# Patient Record
Sex: MALE | Race: BLACK OR AFRICAN AMERICAN
[De-identification: names, ages, dates, MRNs, and addresses within clinical notes are randomized per-mention and may not be internally consistent; named-entity substitution may affect disease eponyms.]

---

## 2021-11-21 ENCOUNTER — HOSPITAL ENCOUNTER (INPATIENT)
Dept: HOSPITAL 54 - ER | Age: 36
LOS: 3 days | Discharge: HOME | DRG: 383 | End: 2021-11-24
Attending: NURSE PRACTITIONER | Admitting: NURSE PRACTITIONER
Payer: MEDICAID

## 2021-11-21 VITALS — DIASTOLIC BLOOD PRESSURE: 62 MMHG | SYSTOLIC BLOOD PRESSURE: 143 MMHG

## 2021-11-21 VITALS — HEIGHT: 70 IN | WEIGHT: 163.31 LBS | BODY MASS INDEX: 23.38 KG/M2

## 2021-11-21 DIAGNOSIS — S80.821A: ICD-10-CM
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
## 2021-11-21 DIAGNOSIS — Z20.822: ICD-10-CM

## 2021-11-21 DIAGNOSIS — Z86.19: ICD-10-CM

## 2021-11-21 DIAGNOSIS — Z79.899: ICD-10-CM
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
## 2021-11-21 DIAGNOSIS — L03.115: Primary | ICD-10-CM

## 2021-11-21 DIAGNOSIS — R59.0: ICD-10-CM
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
## 2021-11-21 DIAGNOSIS — D69.6: ICD-10-CM

## 2021-11-21 DIAGNOSIS — M79.661: ICD-10-CM

## 2021-11-21 LAB
BASOPHILS # BLD AUTO: 0 K/UL (ref 0–0.2)
BASOPHILS NFR BLD AUTO: 0.2 % (ref 0–2)
BUN SERPL-MCNC: 16 MG/DL (ref 7–18)
CALCIUM SERPL-MCNC: 8.6 MG/DL (ref 8.5–10.1)
CHLORIDE SERPL-SCNC: 100 MMOL/L (ref 98–107)
CO2 SERPL-SCNC: 32 MMOL/L (ref 21–32)
CREAT SERPL-MCNC: 1.3 MG/DL (ref 0.6–1.3)
EOSINOPHIL NFR BLD AUTO: 0.4 % (ref 0–6)
GLUCOSE SERPL-MCNC: 97 MG/DL (ref 74–106)
HCT VFR BLD AUTO: 43 % (ref 39–51)
HGB BLD-MCNC: 14.2 G/DL (ref 13.5–17.5)
LYMPHOCYTES NFR BLD AUTO: 1.2 K/UL (ref 0.8–4.8)
LYMPHOCYTES NFR BLD AUTO: 15.6 % (ref 20–44)
MCHC RBC AUTO-ENTMCNC: 33 G/DL (ref 31–36)
MCV RBC AUTO: 81 FL (ref 80–96)
MONOCYTES NFR BLD AUTO: 1.1 K/UL (ref 0.1–1.3)
MONOCYTES NFR BLD AUTO: 14 % (ref 2–12)
NEUTROPHILS # BLD AUTO: 5.5 K/UL (ref 1.8–8.9)
NEUTROPHILS NFR BLD AUTO: 69.8 % (ref 43–81)
PLATELET # BLD AUTO: 126 K/UL (ref 150–450)
POTASSIUM SERPL-SCNC: 3.5 MMOL/L (ref 3.5–5.1)
RBC # BLD AUTO: 5.34 MIL/UL (ref 4.5–6)
SODIUM SERPL-SCNC: 137 MMOL/L (ref 136–145)
WBC NRBC COR # BLD AUTO: 7.9 K/UL (ref 4.3–11)

## 2021-11-21 PROCEDURE — G0378 HOSPITAL OBSERVATION PER HR: HCPCS

## 2021-11-21 RX ADMIN — SODIUM CHLORIDE PRN MLS/HR: 9 INJECTION, SOLUTION INTRAVENOUS at 23:28

## 2021-11-21 RX ADMIN — ENOXAPARIN SODIUM SCH MG: 40 INJECTION SUBCUTANEOUS at 22:12

## 2021-11-21 RX ADMIN — CEFEPIME HYDROCHLORIDE SCH MLS/HR: 1 INJECTION, POWDER, FOR SOLUTION INTRAMUSCULAR; INTRAVENOUS at 23:28

## 2021-11-21 RX ADMIN — ACETAMINOPHEN PRN MG: 325 TABLET ORAL at 22:52

## 2021-11-21 NOTE — NUR
patient came in to the er c/o R leg cellulitis, on room air, breathing evenly, 
connected to the monitor and pulse ox, Kept comfortable, will continue to 
monitor accordingly.

## 2021-11-21 NOTE — NUR
ADMITTED PATIENT FROM ED DUE TO RIGHT LOWER LEG CELLULITIS. ALERT/ORIENTED X4, ROOM AIR, 
LUNG SOUNDS ARE CLEAR, ABDOMEN SOFT AND NON-TENDER, DENIES NUMBNESS OF BLE. PAIN OF THE 
RIGHT LYMPH NODE AND RIGHT LEG, REDNESS, MILD SWELLING, PAIN ON AMBULATION, PER PATIENT, HAS 
HISTORY OF SHINGLES, KEPT SAFE, WILL CONTINUE TO MONITOR.

## 2021-11-22 VITALS — DIASTOLIC BLOOD PRESSURE: 69 MMHG | SYSTOLIC BLOOD PRESSURE: 119 MMHG

## 2021-11-22 VITALS — DIASTOLIC BLOOD PRESSURE: 61 MMHG | SYSTOLIC BLOOD PRESSURE: 126 MMHG

## 2021-11-22 VITALS — DIASTOLIC BLOOD PRESSURE: 71 MMHG | SYSTOLIC BLOOD PRESSURE: 143 MMHG

## 2021-11-22 LAB
ALBUMIN SERPL BCP-MCNC: 3.3 G/DL (ref 3.4–5)
ALP SERPL-CCNC: 72 U/L (ref 46–116)
ALT SERPL W P-5'-P-CCNC: 30 U/L (ref 12–78)
AST SERPL W P-5'-P-CCNC: 37 U/L (ref 15–37)
BASOPHILS # BLD AUTO: 0 K/UL (ref 0–0.2)
BASOPHILS NFR BLD AUTO: 0.3 % (ref 0–2)
BILIRUB SERPL-MCNC: 0.8 MG/DL (ref 0.2–1)
BUN SERPL-MCNC: 13 MG/DL (ref 7–18)
CALCIUM SERPL-MCNC: 8.8 MG/DL (ref 8.5–10.1)
CHLORIDE SERPL-SCNC: 102 MMOL/L (ref 98–107)
CO2 SERPL-SCNC: 30 MMOL/L (ref 21–32)
CREAT SERPL-MCNC: 1.3 MG/DL (ref 0.6–1.3)
EOSINOPHIL NFR BLD AUTO: 1.2 % (ref 0–6)
GLUCOSE SERPL-MCNC: 111 MG/DL (ref 74–106)
HCT VFR BLD AUTO: 39 % (ref 39–51)
HGB BLD-MCNC: 13.5 G/DL (ref 13.5–17.5)
LYMPHOCYTES NFR BLD AUTO: 1.7 K/UL (ref 0.8–4.8)
LYMPHOCYTES NFR BLD AUTO: 23.5 % (ref 20–44)
LYMPHOCYTES NFR BLD MANUAL: 28 % (ref 16–48)
MAGNESIUM SERPL-MCNC: 2.1 MG/DL (ref 1.8–2.4)
MCHC RBC AUTO-ENTMCNC: 34 G/DL (ref 31–36)
MCV RBC AUTO: 80 FL (ref 80–96)
MONOCYTES NFR BLD AUTO: 1.3 K/UL (ref 0.1–1.3)
MONOCYTES NFR BLD AUTO: 18.1 % (ref 2–12)
MONOCYTES NFR BLD MANUAL: 13 % (ref 0–11)
NEUTROPHILS # BLD AUTO: 4.1 K/UL (ref 1.8–8.9)
NEUTROPHILS NFR BLD AUTO: 56.9 % (ref 43–81)
NEUTS SEG NFR BLD MANUAL: 59 % (ref 42–76)
PHOSPHATE SERPL-MCNC: 2.8 MG/DL (ref 2.5–4.9)
PLATELET # BLD AUTO: 132 K/UL (ref 150–450)
POTASSIUM SERPL-SCNC: 3.3 MMOL/L (ref 3.5–5.1)
PROT SERPL-MCNC: 7.2 G/DL (ref 6.4–8.2)
RBC # BLD AUTO: 4.94 MIL/UL (ref 4.5–6)
SODIUM SERPL-SCNC: 139 MMOL/L (ref 136–145)
WBC NRBC COR # BLD AUTO: 7.2 K/UL (ref 4.3–11)

## 2021-11-22 RX ADMIN — CEFEPIME HYDROCHLORIDE SCH MLS/HR: 1 INJECTION, POWDER, FOR SOLUTION INTRAMUSCULAR; INTRAVENOUS at 16:16

## 2021-11-22 RX ADMIN — ACETAMINOPHEN PRN MG: 325 TABLET ORAL at 08:15

## 2021-11-22 RX ADMIN — PANTOPRAZOLE SODIUM SCH MG: 40 TABLET, DELAYED RELEASE ORAL at 08:16

## 2021-11-22 RX ADMIN — DEXTROSE MONOHYDRATE SCH MLS/HR: 50 INJECTION, SOLUTION INTRAVENOUS at 18:34

## 2021-11-22 RX ADMIN — ENOXAPARIN SODIUM SCH MG: 40 INJECTION SUBCUTANEOUS at 21:30

## 2021-11-22 RX ADMIN — CEFEPIME HYDROCHLORIDE SCH MLS/HR: 1 INJECTION, POWDER, FOR SOLUTION INTRAMUSCULAR; INTRAVENOUS at 08:16

## 2021-11-22 NOTE — NUR
ALERT/ORIENTED X4, ROOM AIR, COMPLAINING OF PAIN OF THE RIGHT GROIN LYMPH NODE AND RIGHT 
LEG, DX CELLULITIS OF THE RIGHT LOWER EXTREMITIES. RIGHT LEG IS RED, WITH MILD SWELLING, 
PAINFUL ON AMBULATION. NS AT 75 ML/HR, MAXIPIME Q8HRS, GIVEN IN ED AND IN THE UNIT. 
VANCOMYCIN 1 GRAM X1. WILL ORDER WOUND CARE CONSULT.

## 2021-11-22 NOTE — NUR
RN OPENING NOTE



PATIENT AWAKE IN BED RESTING.  FAMILY IN ROOM. A/O X4. NO S/S OF PAIN NOTED AT THIS TIME. ON 
ROOM AIR. NO DISTRESS OR SHORTNESS OF BREATH NOTED, BREATHING EVEN/UNLABORED. IV ACCESS RAC 
#18G NS 75ML/HR. FALL AND SAFETY MEASURES IN PLACE, BED ALARM ON, BED IN LOW AND LOCK 
POSITION, CALL LIGHT AND TABLE WITHIN EASY REACH, SIDE RAILS UP X2. WILL CONTINUE TO 
MONITOR.

-------------------------------------------------------------------------------

Addendum: 11/22/21 at 1847 by Alyssa Ibarra RN

-------------------------------------------------------------------------------

RN CLOSING NOTE

## 2021-11-22 NOTE — NUR
RN OPENING NOTE



PATIENT AS SLEEP IN BED RESTING. AWAKEN TO VERBAL STIMULI A/O X4. NO S/S OF PAIN NOTED AT 
THIS TIME. ON ROOM AIR. NO DISTRESS OR SHORTNESS OF BREATH NOTED, BREATHING EVEN/UNLABORED. 
IV ACCESS RAC #18G NS 75ML/HR. FALL AND SAFETY MEASURES IN PLACE, BED ALARM ON, BED IN LOW 
AND LOCK POSITION, CALL LIGHT AND TABLE WITHIN EASY REACH, SIDE RAILS UP X2. WILL CONTINUE 
TO MONITOR.

## 2021-11-22 NOTE — NUR
MS RN OPENING NOTES:

RECEIVED PATIENT IN BED, AWAKE, A/O X3. NO S/S OF DISTRESS. PATIENT DENIES ANY PAIN AT THIS 
TIME. RESPIRATION EVEN AND UNLABORED WITH EQUAL RISE AND FALL OF THE CHEST, ON ROOM AIR. ALL 
SAFETY MEASURES IN PLACE. WILL CONTINUE TO MONITOR.

## 2021-11-22 NOTE — NUR
WOUND CARE CONSULT: PT PRESENTS WITH RT LOWER LEG DISCOLORATION AND FRAGILE INTACT 
BLISTERING, PRESENT ON ADMISSION. DPM CONSULT CALLED TO DR LINDSEY CLAUDIO IN AGREEMENT WITH 
PLAN OF CARE.

## 2021-11-23 VITALS — SYSTOLIC BLOOD PRESSURE: 137 MMHG | DIASTOLIC BLOOD PRESSURE: 66 MMHG

## 2021-11-23 VITALS — DIASTOLIC BLOOD PRESSURE: 78 MMHG | SYSTOLIC BLOOD PRESSURE: 121 MMHG

## 2021-11-23 LAB
BASOPHILS # BLD AUTO: 0 K/UL (ref 0–0.2)
BASOPHILS NFR BLD AUTO: 0.5 % (ref 0–2)
BUN SERPL-MCNC: 12 MG/DL (ref 7–18)
CALCIUM SERPL-MCNC: 8.6 MG/DL (ref 8.5–10.1)
CHLORIDE SERPL-SCNC: 103 MMOL/L (ref 98–107)
CO2 SERPL-SCNC: 32 MMOL/L (ref 21–32)
CREAT SERPL-MCNC: 1.2 MG/DL (ref 0.6–1.3)
EOSINOPHIL NFR BLD AUTO: 3.6 % (ref 0–6)
GLUCOSE SERPL-MCNC: 111 MG/DL (ref 74–106)
HCT VFR BLD AUTO: 40 % (ref 39–51)
HGB BLD-MCNC: 13.4 G/DL (ref 13.5–17.5)
LYMPHOCYTES NFR BLD AUTO: 1.8 K/UL (ref 0.8–4.8)
LYMPHOCYTES NFR BLD AUTO: 30.6 % (ref 20–44)
MAGNESIUM SERPL-MCNC: 2 MG/DL (ref 1.8–2.4)
MCHC RBC AUTO-ENTMCNC: 33 G/DL (ref 31–36)
MCV RBC AUTO: 79 FL (ref 80–96)
MONOCYTES NFR BLD AUTO: 0.8 K/UL (ref 0.1–1.3)
MONOCYTES NFR BLD AUTO: 14 % (ref 2–12)
NEUTROPHILS # BLD AUTO: 3 K/UL (ref 1.8–8.9)
NEUTROPHILS NFR BLD AUTO: 51.3 % (ref 43–81)
PHOSPHATE SERPL-MCNC: 3.4 MG/DL (ref 2.5–4.9)
PLATELET # BLD AUTO: 150 K/UL (ref 150–450)
POTASSIUM SERPL-SCNC: 3.6 MMOL/L (ref 3.5–5.1)
RBC # BLD AUTO: 5.06 MIL/UL (ref 4.5–6)
SODIUM SERPL-SCNC: 140 MMOL/L (ref 136–145)
WBC NRBC COR # BLD AUTO: 5.8 K/UL (ref 4.3–11)

## 2021-11-23 RX ADMIN — PANTOPRAZOLE SODIUM SCH MG: 40 TABLET, DELAYED RELEASE ORAL at 08:19

## 2021-11-23 RX ADMIN — DEXTROSE MONOHYDRATE SCH MLS/HR: 50 INJECTION, SOLUTION INTRAVENOUS at 04:22

## 2021-11-23 RX ADMIN — SODIUM CHLORIDE PRN MLS/HR: 9 INJECTION, SOLUTION INTRAVENOUS at 12:29

## 2021-11-23 RX ADMIN — DEXTROSE MONOHYDRATE SCH MLS/HR: 50 INJECTION, SOLUTION INTRAVENOUS at 17:05

## 2021-11-23 RX ADMIN — CEFEPIME HYDROCHLORIDE SCH MLS/HR: 1 INJECTION, POWDER, FOR SOLUTION INTRAMUSCULAR; INTRAVENOUS at 23:52

## 2021-11-23 RX ADMIN — CEFEPIME HYDROCHLORIDE SCH MLS/HR: 1 INJECTION, POWDER, FOR SOLUTION INTRAMUSCULAR; INTRAVENOUS at 00:20

## 2021-11-23 RX ADMIN — CEFEPIME HYDROCHLORIDE SCH MLS/HR: 1 INJECTION, POWDER, FOR SOLUTION INTRAMUSCULAR; INTRAVENOUS at 15:42

## 2021-11-23 RX ADMIN — ENOXAPARIN SODIUM SCH MG: 40 INJECTION SUBCUTANEOUS at 20:43

## 2021-11-23 RX ADMIN — CEFEPIME HYDROCHLORIDE SCH MLS/HR: 1 INJECTION, POWDER, FOR SOLUTION INTRAMUSCULAR; INTRAVENOUS at 08:20

## 2021-11-23 NOTE — NUR
MS RN OPENING NOTE



RECEIVED PATIENT ALERT/ ORIENTED X 4. PATIENT IS ON ROOM AIR WITH EQUAL AND UNLABORED 
BREATHING WITH NO SIGNS OF RESPIRATORY DISTRESS. PATIENT DOS NOT COMPLAIN OF PAIN OR 
DISCOMFORT FOR NOW. PATIENT WITH IV ACCESS ON RIGHT AC G 18 WITH IVF OF NS RUNNING AT 
75ML/HR INFUSING WELL. SAFETY MEASURES ENSURED WITH BED LOCKED AND AT LOWEST POSITION, WITH 
SIDERAILS UP. CALL LIGHT AND TABLE WITHIN REACH AT ALL TIMES. WILL CONTINUE TO MONITOR 
PATIENT.

## 2021-11-23 NOTE — NUR
RN OPENING NOTES

Patient is A&Ox4, girlfriend at bedside. Says only very minimal pain to Right lower leg but 
is better when elevated on pillow. Denies side effects from abx. Tolerating well. RAC #18G 
IV patent and infusing NS at 75cc/hr. Will continue with plan of care.

## 2021-11-24 VITALS — DIASTOLIC BLOOD PRESSURE: 75 MMHG | SYSTOLIC BLOOD PRESSURE: 143 MMHG

## 2021-11-24 LAB
BASOPHILS # BLD AUTO: 0 K/UL (ref 0–0.2)
BASOPHILS NFR BLD AUTO: 0.4 % (ref 0–2)
BUN SERPL-MCNC: 7 MG/DL (ref 7–18)
CALCIUM SERPL-MCNC: 8.8 MG/DL (ref 8.5–10.1)
CHLORIDE SERPL-SCNC: 104 MMOL/L (ref 98–107)
CO2 SERPL-SCNC: 31 MMOL/L (ref 21–32)
CREAT SERPL-MCNC: 1.1 MG/DL (ref 0.6–1.3)
EOSINOPHIL NFR BLD AUTO: 4.3 % (ref 0–6)
GLUCOSE SERPL-MCNC: 99 MG/DL (ref 74–106)
HCT VFR BLD AUTO: 39 % (ref 39–51)
HGB BLD-MCNC: 13 G/DL (ref 13.5–17.5)
LYMPHOCYTES NFR BLD AUTO: 1.7 K/UL (ref 0.8–4.8)
LYMPHOCYTES NFR BLD AUTO: 31.1 % (ref 20–44)
MAGNESIUM SERPL-MCNC: 2 MG/DL (ref 1.8–2.4)
MCHC RBC AUTO-ENTMCNC: 33 G/DL (ref 31–36)
MCV RBC AUTO: 80 FL (ref 80–96)
MONOCYTES NFR BLD AUTO: 0.7 K/UL (ref 0.1–1.3)
MONOCYTES NFR BLD AUTO: 13.5 % (ref 2–12)
NEUTROPHILS # BLD AUTO: 2.7 K/UL (ref 1.8–8.9)
NEUTROPHILS NFR BLD AUTO: 50.7 % (ref 43–81)
PHOSPHATE SERPL-MCNC: 3.1 MG/DL (ref 2.5–4.9)
PLATELET # BLD AUTO: 178 K/UL (ref 150–450)
POTASSIUM SERPL-SCNC: 3.4 MMOL/L (ref 3.5–5.1)
RBC # BLD AUTO: 4.87 MIL/UL (ref 4.5–6)
SODIUM SERPL-SCNC: 142 MMOL/L (ref 136–145)
WBC NRBC COR # BLD AUTO: 5.4 K/UL (ref 4.3–11)

## 2021-11-24 RX ADMIN — DEXTROSE MONOHYDRATE SCH MLS/HR: 50 INJECTION, SOLUTION INTRAVENOUS at 03:55

## 2021-11-24 RX ADMIN — SODIUM CHLORIDE PRN MLS/HR: 9 INJECTION, SOLUTION INTRAVENOUS at 05:52

## 2021-11-24 RX ADMIN — CEFEPIME HYDROCHLORIDE SCH MLS/HR: 1 INJECTION, POWDER, FOR SOLUTION INTRAMUSCULAR; INTRAVENOUS at 09:03

## 2021-11-24 RX ADMIN — PANTOPRAZOLE SODIUM SCH MG: 40 TABLET, DELAYED RELEASE ORAL at 07:50

## 2021-11-24 NOTE — NUR
MS RN OPENING NOTES



RECEIVED PT AWAKE, AMBULATORY, NO SIGNS OF ACUTE DISTRESS NOTED. ON ROOM AIR, NO SOB NOTED, 
BREATHING EVEN AND UNLABORED. WITH IV ACCESS ON RAC #18g, INTACT AND PATENT, WITH NS 
@75ML/HR RUNNING. OFFERED NO COMPLAINTS. SAFETY PRECAUTIONS MAINTAINED, BED LOCKED AND IN 
LOWEST POSITION, SR UP X2, CALL LIGHT PLACED WITHIN EASY REACH. WILL CONTINUE TO MONITOR.

## 2021-11-24 NOTE — NUR
RN DISCHARGE NOTES



PT DISCHARGE HOME IN STABLE CONDITION. A/O X4, VERBALLY RESPONSIVE, ABLE TO MAKE NEEDS 
KNOWN. VITAL SIGNS TAKEN, STABLE AND RECORDED. ALL BELONGINGS ACCOUNTED FOR, FORM SIGNED BY 
PT. IV ACCESS ON RIGHT AC REMOVED, NO BLEEDING NOTED, DRY PRESSURE DRESSING APPLIED TO SITE. 
NAME [employer] REMOVED, HEALTH TEACHINGS AND DISCHARGE INSTRUCTIONS PROVIDED TO PT WITH 
VERBALIZATION OF UNDERSTANDING. RIGHT LOWER LEG CELLULITIS PHOTO TAKEN, PLACED IN PT'S 
CHART. APPLIED DRESSING ON SITE, ALSO COMPRESSION STOCKING APPLIED AS PER MD'S INSTRUCTIONS. 
PT LEFT UNIT AT 1525 AMBULATORY. PT WILL BE PICKED-UP BY [facility] VIA [employer]IVATE CAR. CN AND 
MD AWARE OF DISCHARGE.

## 2021-11-24 NOTE — NUR
MS RN CLOSING NOTE



PATIENT ALERT/ ORIENTED X 4. PATIENT IS ON ROOM AIR WITH EQUAL AND UNLABORED BREATHING WITH 
NO SIGNS OF RESPIRATORY DISTRESS. PATIENT DOS NOT COMPLAIN OF PAIN OR DISCOMFORT FOR NOW. 
PATIENT WITH IV ACCESS ON RIGHT AC G 18 WITH IVF OF NS RUNNING AT 75ML/HR INFUSING WELL. 
SAFETY MEASURES ENSURED WITH BED LOCKED AND AT LOWEST POSITION, WITH SIDERAILS UP. CALL 
LIGHT AND TABLE WITHIN REACH AT ALL TIMES. WILL ENDORSE PATIENT FOR CONTINUITY OF CARE.

## 2021-11-24 NOTE — NUR
SW received consult for insurance/medical coverage.

SW met with pt. regarding his insurance coverage. Pt. mentioned he had stayed over night at 
hospital, its expensive, and he is concerned that his insurance wont cover his stay. SW 
tried getting in contact with the [employer] , so she can talk to pt., but 
no answer. SW will try again.

## 2021-11-24 NOTE — NUR
RN CLOSING NOTES

Patient slept well overnight though easy to wake. Tolerating IV ABX well. Pain to Rlowerleg 
improved and no PRN medications needed. Patient is currently awake, A&Ox4. No signs of 
distress.

## 2022-03-19 ENCOUNTER — HOSPITAL ENCOUNTER (EMERGENCY)
Dept: HOSPITAL 54 - ER | Age: 37
Discharge: HOME | End: 2022-03-19
Payer: MEDICAID

## 2022-03-19 VITALS — BODY MASS INDEX: 27.09 KG/M2 | HEIGHT: 72 IN | WEIGHT: 200 LBS

## 2022-03-19 VITALS — SYSTOLIC BLOOD PRESSURE: 107 MMHG | DIASTOLIC BLOOD PRESSURE: 68 MMHG

## 2022-03-19 DIAGNOSIS — Z79.1: ICD-10-CM

## 2022-03-19 DIAGNOSIS — Z60.2: ICD-10-CM

## 2022-03-19 DIAGNOSIS — Z79.899: ICD-10-CM

## 2022-03-19 DIAGNOSIS — L03.115: Primary | ICD-10-CM

## 2022-03-19 LAB
BASOPHILS # BLD AUTO: 0 K/UL (ref 0–0.2)
BASOPHILS NFR BLD AUTO: 0.4 % (ref 0–2)
BUN SERPL-MCNC: 17 MG/DL (ref 7–18)
CALCIUM SERPL-MCNC: 8.6 MG/DL (ref 8.5–10.1)
CHLORIDE SERPL-SCNC: 103 MMOL/L (ref 98–107)
CO2 SERPL-SCNC: 29 MMOL/L (ref 21–32)
CREAT SERPL-MCNC: 1.3 MG/DL (ref 0.6–1.3)
EOSINOPHIL NFR BLD AUTO: 0.5 % (ref 0–6)
GLUCOSE SERPL-MCNC: 112 MG/DL (ref 74–106)
HCT VFR BLD AUTO: 43 % (ref 39–51)
HGB BLD-MCNC: 13.9 G/DL (ref 13.5–17.5)
LYMPHOCYTES NFR BLD AUTO: 1.5 K/UL (ref 0.8–4.8)
LYMPHOCYTES NFR BLD AUTO: 12.9 % (ref 20–44)
MCHC RBC AUTO-ENTMCNC: 33 G/DL (ref 31–36)
MCV RBC AUTO: 80 FL (ref 80–96)
MONOCYTES NFR BLD AUTO: 1.3 K/UL (ref 0.1–1.3)
MONOCYTES NFR BLD AUTO: 11.5 % (ref 2–12)
NEUTROPHILS # BLD AUTO: 8.7 K/UL (ref 1.8–8.9)
NEUTROPHILS NFR BLD AUTO: 74.7 % (ref 43–81)
PLATELET # BLD AUTO: 146 K/UL (ref 150–450)
POTASSIUM SERPL-SCNC: 3.5 MMOL/L (ref 3.5–5.1)
RBC # BLD AUTO: 5.35 MIL/UL (ref 4.5–6)
SODIUM SERPL-SCNC: 136 MMOL/L (ref 136–145)
WBC NRBC COR # BLD AUTO: 11.6 K/UL (ref 4.3–11)

## 2022-03-19 PROCEDURE — 36415 COLL VENOUS BLD VENIPUNCTURE: CPT

## 2022-03-19 PROCEDURE — 80048 BASIC METABOLIC PNL TOTAL CA: CPT

## 2022-03-19 PROCEDURE — 96365 THER/PROPH/DIAG IV INF INIT: CPT

## 2022-03-19 PROCEDURE — 99284 EMERGENCY DEPT VISIT MOD MDM: CPT

## 2022-03-19 PROCEDURE — 85025 COMPLETE CBC W/AUTO DIFF WBC: CPT

## 2022-03-19 SDOH — SOCIAL STABILITY - SOCIAL INSECURITY: PROBLEMS RELATED TO LIVING ALONE: Z60.2

## 2022-04-30 ENCOUNTER — HOSPITAL ENCOUNTER (EMERGENCY)
Dept: HOSPITAL 54 - ER | Age: 37
Discharge: HOME | End: 2022-04-30
Payer: MEDICAID

## 2022-04-30 VITALS — BODY MASS INDEX: 24.34 KG/M2 | HEIGHT: 70 IN | WEIGHT: 170 LBS

## 2022-04-30 VITALS — SYSTOLIC BLOOD PRESSURE: 138 MMHG | DIASTOLIC BLOOD PRESSURE: 73 MMHG

## 2022-04-30 DIAGNOSIS — Z79.899: ICD-10-CM

## 2022-04-30 DIAGNOSIS — Z60.2: ICD-10-CM

## 2022-04-30 DIAGNOSIS — H10.9: Primary | ICD-10-CM

## 2022-04-30 SDOH — SOCIAL STABILITY - SOCIAL INSECURITY: PROBLEMS RELATED TO LIVING ALONE: Z60.2
